# Patient Record
Sex: FEMALE | Race: WHITE | Employment: OTHER | URBAN - NONMETROPOLITAN AREA
[De-identification: names, ages, dates, MRNs, and addresses within clinical notes are randomized per-mention and may not be internally consistent; named-entity substitution may affect disease eponyms.]

---

## 2021-12-16 ENCOUNTER — OFFICE VISIT (OUTPATIENT)
Dept: PHYSICAL MEDICINE AND REHAB | Age: 51
End: 2021-12-16
Payer: MEDICARE

## 2021-12-16 VITALS
HEIGHT: 62 IN | BODY MASS INDEX: 29.08 KG/M2 | DIASTOLIC BLOOD PRESSURE: 82 MMHG | WEIGHT: 158 LBS | SYSTOLIC BLOOD PRESSURE: 124 MMHG

## 2021-12-16 DIAGNOSIS — G89.4 CHRONIC PAIN SYNDROME: ICD-10-CM

## 2021-12-16 DIAGNOSIS — M54.17 RADICULOPATHY, LUMBOSACRAL REGION: ICD-10-CM

## 2021-12-16 DIAGNOSIS — M54.12 CERVICAL RADICULOPATHY: ICD-10-CM

## 2021-12-16 DIAGNOSIS — M48.061 LUMBAR FORAMINAL STENOSIS: ICD-10-CM

## 2021-12-16 DIAGNOSIS — M47.816 LUMBAR FACET ARTHROPATHY: ICD-10-CM

## 2021-12-16 DIAGNOSIS — M48.02 FORAMINAL STENOSIS OF CERVICAL REGION: ICD-10-CM

## 2021-12-16 DIAGNOSIS — M54.17 LUMBOSACRAL RADICULITIS: ICD-10-CM

## 2021-12-16 DIAGNOSIS — M79.18 MYOFASCIAL PAIN: Primary | ICD-10-CM

## 2021-12-16 DIAGNOSIS — G35 MULTIPLE SCLEROSIS, RELAPSING-REMITTING (HCC): ICD-10-CM

## 2021-12-16 DIAGNOSIS — M47.812 ARTHROPATHY OF CERVICAL FACET JOINT: ICD-10-CM

## 2021-12-16 PROCEDURE — G8419 CALC BMI OUT NRM PARAM NOF/U: HCPCS | Performed by: PAIN MEDICINE

## 2021-12-16 PROCEDURE — G8484 FLU IMMUNIZE NO ADMIN: HCPCS | Performed by: PAIN MEDICINE

## 2021-12-16 PROCEDURE — 3017F COLORECTAL CA SCREEN DOC REV: CPT | Performed by: PAIN MEDICINE

## 2021-12-16 PROCEDURE — 99205 OFFICE O/P NEW HI 60 MIN: CPT | Performed by: PAIN MEDICINE

## 2021-12-16 PROCEDURE — 20553 NJX 1/MLT TRIGGER POINTS 3/>: CPT | Performed by: PAIN MEDICINE

## 2021-12-16 PROCEDURE — G8427 DOCREV CUR MEDS BY ELIG CLIN: HCPCS | Performed by: PAIN MEDICINE

## 2021-12-16 PROCEDURE — 4004F PT TOBACCO SCREEN RCVD TLK: CPT | Performed by: PAIN MEDICINE

## 2021-12-16 RX ORDER — CYCLOBENZAPRINE HCL 10 MG
TABLET ORAL
COMMUNITY
Start: 2021-11-09

## 2021-12-16 RX ORDER — ROPINIROLE 1 MG/1
TABLET, FILM COATED ORAL
COMMUNITY
Start: 2021-01-25

## 2021-12-16 ASSESSMENT — ENCOUNTER SYMPTOMS
CONSTIPATION: 0
VOMITING: 0
BACK PAIN: 1
COLOR CHANGE: 0
NAUSEA: 0
EYE PAIN: 0
CHEST TIGHTNESS: 0
SORE THROAT: 0
SHORTNESS OF BREATH: 0
RHINORRHEA: 0
ABDOMINAL PAIN: 0
PHOTOPHOBIA: 0
SINUS PRESSURE: 1
DIARRHEA: 1
COUGH: 0
WHEEZING: 0

## 2021-12-16 NOTE — PROGRESS NOTES
[address]  Dept: [phone]  Dept Fax: [phone]: [phone]    Visit Date: 12/16/2021    Eden Hernandez is a 46 y.o. female who is referred for pain management evaluation and treatment per Dr. Marla Norris. CAGE and CAGE-[facility] Questions   1. In the last three months, have you felt you should cut down or stop drinking or using drugs? Yes []        No [x]     2. In the last three months, has anyone annoyed you or gotten on your nerves by telling you to cut down or stop drinking or using drugs? Yes []        No [x]     3. In the last three months, have you felt guilty or bad about how much you drink or use drugs? Yes []        No [x]     4. In the last three months, have you been waking up wanting to have an alcoholic drink or use drugs? Yes []        No [x]        Opioid Risk Tool:  Clinician Form       1. Family History of Substance Abuse: Female Male    Alcohol   []1   []3    Illegal drugs   []2   []3    Prescription drugs     []4   []4   2. Personal History of Substance Abuse:          Alcohol   []3   []3    Illegal drugs   []4   []4    Prescription drugs     []5   []5   3. Age (chayo box if between 12 and 39):     []1   []1   4. History of Preadolescent Sexual Abuse:     []3   []0   5. Psychological Disease:      Attention deficit disorder, obsessive-compulsive disorder, bipolar, schizophrenia   []2   []2      Depression     []1   []1    Scoring Totals       Total Score  Low Risk  Moderate Risk  High Risk   Risk Category   0 - 3   4 - 7   8 or Above      Patient states symptoms interfere with:  A.  General Activity:  yes   B. Mood: yes    C. Walking Ability:   yes   D. Normal Work (Includes both work outside the home and housework):   yes    E.  Relations with Other People:  yes   F. Sleep:   yes   G.  Enjoyment of Life:  yes       HPI: ChiefComplaint: Low back pain and Neck pain, shoulder pain- right    Patient referred here by  Dr Deborah Red. Patient complains of pain everywhere. Has Hx of MS. States she has had pain for her whole life. Has decreased sensation throughout body d/t MS- left side worse. Had right shoulder surgery 2020 with Dr Claribel Pinon. Taking [place] 5/325mg from Dr Misha Camarillo office. Previously seen pain provider in [place]- last saw 06/2021 and Jael Camp- unsure what dates. Worked at [employer]. Low back - had fusion 2011 L4-L5. [employer]h [employer] at [employer] 2010. Pain for 10+ years. Pain radiates down both legs, constant pain. Top of both thighs feel numb and painful. Cervical pain - C5-6 ACDF 2021. Pain for 30+ years. No accidents, injury or falls. Radiates down both arms to the fingertips, hands feel very stiff. Shoulder pain d/t recent shoulder surgery. Shoulder 10%, neck 30% low back pain 60% out of 100%. Patient pain increases with bending, lifting, twisting , turning head, standing, sitting and laying. Treatments tried PT/HEP, ice, heat, Chiropractor, NSAIDS, narcotics, muscle relaxer, OTC rubs creams patches, braces, massage or TPI, injections, TENS and traction inversion  Pain description sharp, stabbing, burning, pressure, throbbing, aching, numbness and tingling  Pain rating  scale 1-10 highest  10  lowest  6  average   6    · PT: Yes,  any benefit? No, how many weeks? 6 months, last date done: 05/2021  · Spine surgeon consult: Yes  · Any Implants: Yes    The patient is allergic to bee venom and pcn [penicillins]. LUMBAR MRI      LUMBAR XR    CERVICAL MRI      CERVICAL XR    Subjective:            Review of Systems   Constitutional: Positive for activity change and fatigue. Negative for appetite change, chills, diaphoresis, fever and unexpected weight change. HENT: Positive for hearing loss and sinus pressure.  Negative for congestion, ear pain, mouth sores, nosebleeds, rhinorrhea and sore throat. Eyes: Negative for photophobia and pain. Corrective lenses at times   Respiratory: Negative for cough, chest tightness, shortness of breath and wheezing. KISHA, asthma   Cardiovascular: Negative for chest pain and palpitations. Gastrointestinal: Positive for diarrhea. Negative for abdominal pain, constipation, nausea and vomiting. Ulcerative colitis   Endocrine: Negative for cold intolerance, heat intolerance, polydipsia, polyphagia and polyuria. Genitourinary: Negative for decreased urine volume, difficulty urinating, frequency and hematuria. Musculoskeletal: Positive for arthralgias, back pain, gait problem, myalgias, neck pain and neck stiffness. Negative for joint swelling. Skin: Negative for color change and rash. Allergic/Immunologic: Negative for food allergies and immunocompromised state. Neurological: Negative for dizziness, tremors, seizures, syncope, facial asymmetry, speech difficulty, weakness, light-headedness, numbness and headaches. Hematological: Does not bruise/bleed easily. Psychiatric/Behavioral: Negative for agitation, behavioral problems, confusion, decreased concentration, dysphoric mood, hallucinations, self-injury, sleep disturbance and suicidal ideas. The patient is nervous/anxious. The patient is not hyperactive. Depression, bi-polar       Objective:     Vitals:    12/16/21 1236   BP: 124/82   Weight: 158 lb (71.7 kg)   Height: 5' 2\" (1.575 m)       Physical Exam  Vitals and nursing note reviewed. Constitutional:       General: She is not in acute distress. Appearance: She is well-developed. She is not diaphoretic. HENT:      Head: Normocephalic and atraumatic. Right Ear: External ear normal.      Left Ear: External ear normal.      Nose: Nose normal.      Mouth/Throat:      Pharynx: No oropharyngeal exudate. Eyes:      General: No scleral icterus. Right eye: No discharge. Left eye: No discharge. Conjunctiva/sclera: Conjunctivae normal.      Pupils: Pupils are equal, round, and reactive to light. Neck:      Thyroid: No thyromegaly. Cardiovascular:      Rate and Rhythm: Normal rate and regular rhythm. Heart sounds: Normal heart sounds. No murmur heard. No friction rub. No gallop. Pulmonary:      Effort: Pulmonary effort is normal. No respiratory distress. Breath sounds: Normal breath sounds. No wheezing or rales. Chest:      Chest wall: No tenderness. Abdominal:      General: Bowel sounds are normal. There is no distension. Palpations: Abdomen is soft. Tenderness: There is no abdominal tenderness. There is no guarding or rebound. Musculoskeletal:         General: Tenderness present. Right shoulder: Tenderness and bony tenderness present. Decreased range of motion. Left shoulder: Tenderness and bony tenderness present. Decreased range of motion. Cervical back: Rigidity, spasms and tenderness present. No edema or erythema. Pain with movement, spinous process tenderness and muscular tenderness present. Decreased range of motion. Thoracic back: Tenderness and bony tenderness present. Lumbar back: Spasms, tenderness and bony tenderness present. Decreased range of motion. Positive right straight leg raise test and positive left straight leg raise test.        Back:       Right hip: Tenderness and bony tenderness present. Decreased range of motion. Left hip: Tenderness and bony tenderness present. Decreased range of motion. Skin:     General: Skin is warm. Coloration: Skin is not pale. Findings: No erythema or rash. Neurological:      Mental Status: She is alert and oriented to person, place, and time. She is not disoriented. Cranial Nerves: No cranial nerve deficit. Sensory: No sensory deficit. Motor: No atrophy or abnormal muscle tone.       Coordination: Coordination normal.      Gait: Gait normal.      Deep Tendon Reflexes: Reflexes are normal and symmetric. Babinski sign absent on the right side. Psychiatric:         Attention and Perception: She is attentive. Mood and Affect: Mood is not anxious or depressed. Affect is not labile, blunt, angry or inappropriate. Speech: She is communicative. Speech is not rapid and pressured, delayed, slurred or tangential.         Behavior: Behavior is not agitated, slowed, aggressive, withdrawn, hyperactive or combative. Thought Content: Thought content is not paranoid or delusional. Thought content does not include homicidal or suicidal ideation. Thought content does not include homicidal or suicidal plan. Cognition and Memory: Memory is not impaired. She does not exhibit impaired recent memory or impaired remote memory. Judgment: Judgment is not impulsive or inappropriate. JAVIER  Patricks test  positive  Yeoman's  positive  Gaenslen's  positive  Kemps  positive  Spurlings  positive  Phalens Reverse Phalens  na  Tinel test  na       Assessment:     1. Myofascial pain    2. Lumbar facet arthropathy    3. Arthropathy of cervical facet joint    4. Multiple sclerosis, relapsing-remitting ([facility] [address]    5. Chronic pain syndrome    6. Radiculopathy, lumbosacral region    7. Lumbosacral radiculitis    8. Cervical radiculopathy    9. Lumbar foraminal stenosis    10. Foraminal stenosis of cervical region            Plan:      · Patient read and signed orientation and opioid agreement. · OARRS reviewed. Current MED: 15  · Patient was not offered naloxone for home. · Discussed long term side effects of medications, tolerance, dependency and addiction. · UDS preformed today. · Patient told can not receive any pain medications from any other source. · No evidence of abuse, diversion or aberrant behavior.   · Prescription Needs: Await clean UDS for future prescription needs   Medications and/or procedures to improve function and quality of life- patient understanding with this and that may not be pain free   Discussed possible weaning of medication dosing dependent on treatment/procedure results.  Discussed with patient about safe storage of medications at home   Testing: reviewed Cervical XR, MRI, Lumbar XR, MRI   Procedures: has seen pain management in the past- had back and neck injections done there. Will get records. Patient unsure what they did.  TPI injections today - patient states she cannot tolerate steroid d/t her MS. Discussed TPI with bupivacaine - patient agreeable to this.  Discussed with patient about risks with procedure including infection, reaction to medication, increased pain, or bleeding.  Medications: awaiting UDS- patient states has smoked marjuana these past few days, and had alcohol with lunch today. Discussed cannot have THC or ETOH in UDS if we prescribe.  If patient is on blood thinners will need approval to hold: yes or no: NA          Meds. Prescribed:   No orders of the defined types were placed in this encounter. Return in about 6 weeks (around 1/27/2022) for f/u after TPI and getting records . Electronically signed by Mkie Silva MD on 12/16/2021 at 3:28 PM    Procedure  Visit Date: 12/16/21    Nam Palmer is a 46 y.o. female presents today in the office for the following:  Chief Complaint   Patient presents with    New Patient     Lumbar and cervical pain       History of Present Illness   BODØ is here today for trigger point injections. Pre-Op Diagnosis   Myofacial pain syndrome     Post-Op Diagnosis   Myofacial pain syndrome     Procedure: Trigger point injection(s)    Procedure Documentation   Patient identified. Consent signed. Site identified. Trigger points were identified in the cervical, thoracic and lumbar paraspinals for a total of 20 trigger point injections.  Then with a 25g needle entered each trigger point and after negative aspiration, 1 cc 0.5% bupivacaine was injected at each trigger point for a total of 20 trigger points. Total of 20 sites were injected into 3 muscles. Procedural Complications: None      Vitals:    12/16/21 1236   BP: 124/82   Weight: 158 lb (71.7 kg)   Height: 5' 2\" (1.575 m)       Conclusion   No complications encountered. Patient discharged stable condition. Patient told if any problems to call office or go to ER. No orders of the defined types were placed in this encounter.       Electronically signed by Butch Weiner MD on 12/16/21 at 2:10 PM EST